# Patient Record
Sex: MALE | URBAN - METROPOLITAN AREA
[De-identification: names, ages, dates, MRNs, and addresses within clinical notes are randomized per-mention and may not be internally consistent; named-entity substitution may affect disease eponyms.]

---

## 2019-10-01 LAB — INR (EXTERNAL): 1 (ref 0.9–1.1)

## 2020-04-22 ENCOUNTER — TRANSFERRED RECORDS (OUTPATIENT)
Dept: HEALTH INFORMATION MANAGEMENT | Facility: CLINIC | Age: 70
End: 2020-04-22

## 2020-06-03 ENCOUNTER — TRANSFERRED RECORDS (OUTPATIENT)
Dept: HEALTH INFORMATION MANAGEMENT | Facility: CLINIC | Age: 70
End: 2020-06-03

## 2020-06-04 ENCOUNTER — TRANSFERRED RECORDS (OUTPATIENT)
Dept: HEALTH INFORMATION MANAGEMENT | Facility: CLINIC | Age: 70
End: 2020-06-04

## 2020-07-27 ENCOUNTER — TRANSFERRED RECORDS (OUTPATIENT)
Dept: HEALTH INFORMATION MANAGEMENT | Facility: CLINIC | Age: 70
End: 2020-07-27

## 2020-12-03 LAB — RETINOPATHY: NORMAL

## 2021-01-13 ENCOUNTER — TRANSFERRED RECORDS (OUTPATIENT)
Dept: HEALTH INFORMATION MANAGEMENT | Facility: CLINIC | Age: 71
End: 2021-01-13

## 2021-02-02 ENCOUNTER — TELEPHONE (OUTPATIENT)
Dept: TRANSPLANT | Facility: CLINIC | Age: 71
End: 2021-02-02

## 2021-02-02 NOTE — TELEPHONE ENCOUNTER
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 Correct phone number for [facility] was listed on the paper survey and he will call tomorrow.

## 2021-02-02 NOTE — TELEPHONE ENCOUNTER
Patient Call: Voicemail  Date/Time: 2/2/2021 @ 2:35pm  Reason for call: patient left voicemail he would like to give a health survey over the phone. Please call him back.

## 2021-02-19 ENCOUNTER — TRANSFERRED RECORDS (OUTPATIENT)
Dept: HEALTH INFORMATION MANAGEMENT | Facility: CLINIC | Age: 71
End: 2021-02-19

## 2021-02-19 LAB
ALT SERPL-CCNC: 20 U/L (ref 0–50)
AST SERPL-CCNC: 26 U/L (ref 17–59)
CREAT SERPL-MCNC: 2.71 MG/DL (ref 0.66–1.25)
GFR SERPL CREATININE-BSD FRML MDRD: 23 ML/MIN/1.73M2
GLUCOSE SERPL-MCNC: 53 MG/DL (ref 74–106)
HBA1C MFR BLD: 7.1 % (ref 4–6)
POTASSIUM SERPL-SCNC: 4.8 MEQ/L (ref 3.4–5)

## 2021-06-30 ENCOUNTER — TRANSFERRED RECORDS (OUTPATIENT)
Dept: HEALTH INFORMATION MANAGEMENT | Facility: CLINIC | Age: 71
End: 2021-06-30

## 2021-06-30 LAB
ALT SERPL-CCNC: 25 U/L (ref 0–50)
AST SERPL-CCNC: 32 U/L (ref 17–59)
CREATININE (EXTERNAL): 3.29 MG/DL (ref 0.66–1.25)
GLUCOSE (EXTERNAL): 304 MG/DL (ref 74–106)
HBA1C MFR BLD: 7.7 % (ref 4–6)
POTASSIUM (EXTERNAL): 5.5 MEQ/L (ref 3.4–5)

## 2021-09-16 ENCOUNTER — TRANSFERRED RECORDS (OUTPATIENT)
Dept: HEALTH INFORMATION MANAGEMENT | Facility: CLINIC | Age: 71
End: 2021-09-16

## 2021-10-19 ENCOUNTER — TRANSFERRED RECORDS (OUTPATIENT)
Dept: HEALTH INFORMATION MANAGEMENT | Facility: CLINIC | Age: 71
End: 2021-10-19

## 2021-10-19 LAB
ALT SERPL-CCNC: 20 U/L (ref 0–50)
AST SERPL-CCNC: 28 U/L (ref 17–59)
CREATININE (EXTERNAL): 3.25 MG/DL (ref 0.66–1.25)
GLUCOSE (EXTERNAL): 131 MG/DL (ref 74–106)
HBA1C MFR BLD: 7.1 % (ref 4–6)
POTASSIUM (EXTERNAL): 5 MEQ/L (ref 3.4–5)

## 2021-12-07 ENCOUNTER — TRANSFERRED RECORDS (OUTPATIENT)
Dept: HEALTH INFORMATION MANAGEMENT | Facility: CLINIC | Age: 71
End: 2021-12-07

## 2023-02-09 ENCOUNTER — TRANSFERRED RECORDS (OUTPATIENT)
Dept: HEALTH INFORMATION MANAGEMENT | Facility: CLINIC | Age: 73
End: 2023-02-09

## 2023-02-09 LAB
ALT SERPL-CCNC: 5 U/L (ref 21–72)
AST SERPL-CCNC: 5 U/L (ref 21–72)
CREATININE (EXTERNAL): 3.9 MG/DL (ref 0.66–1.25)
GFR ESTIMATED (EXTERNAL): 16 ML/MIN/1.73M2
GFR ESTIMATED (IF AFRICAN AMERICAN) (EXTERNAL): ABNORMAL ML/MIN/1.73M2
GLUCOSE (EXTERNAL): 138 MG/DL (ref 70–106)
POTASSIUM (EXTERNAL): 4.3 MMOL/L (ref 3.4–5)

## 2023-11-07 ENCOUNTER — TRANSFERRED RECORDS (OUTPATIENT)
Dept: HEALTH INFORMATION MANAGEMENT | Facility: CLINIC | Age: 73
End: 2023-11-07

## 2025-02-04 ENCOUNTER — POST MORTEM DOCUMENTATION (OUTPATIENT)
Dept: TRANSPLANT | Facility: CLINIC | Age: 75
End: 2025-02-04